# Patient Record
Sex: MALE | Race: WHITE | URBAN - METROPOLITAN AREA
[De-identification: names, ages, dates, MRNs, and addresses within clinical notes are randomized per-mention and may not be internally consistent; named-entity substitution may affect disease eponyms.]

---

## 2017-01-01 ENCOUNTER — CARE COORDINATION (OUTPATIENT)
Dept: GASTROENTEROLOGY | Facility: CLINIC | Age: 57
End: 2017-01-01

## 2017-01-01 ENCOUNTER — OFFICE VISIT (OUTPATIENT)
Dept: GASTROENTEROLOGY | Facility: CLINIC | Age: 57
End: 2017-01-01
Attending: INTERNAL MEDICINE
Payer: COMMERCIAL

## 2017-01-01 VITALS
BODY MASS INDEX: 26.38 KG/M2 | WEIGHT: 188.4 LBS | HEART RATE: 91 BPM | HEIGHT: 71 IN | TEMPERATURE: 98.5 F | SYSTOLIC BLOOD PRESSURE: 117 MMHG | DIASTOLIC BLOOD PRESSURE: 77 MMHG

## 2017-01-01 DIAGNOSIS — K70.31 ALCOHOLIC CIRRHOSIS OF LIVER WITH ASCITES (H): Primary | ICD-10-CM

## 2017-01-01 DIAGNOSIS — K74.60 CIRRHOSIS OF LIVER (H): ICD-10-CM

## 2017-01-01 DIAGNOSIS — K70.31 ALCOHOLIC CIRRHOSIS OF LIVER WITH ASCITES (H): ICD-10-CM

## 2017-01-01 DIAGNOSIS — K76.82 HEPATIC ENCEPHALOPATHY (H): ICD-10-CM

## 2017-01-01 LAB
ALBUMIN SERPL-MCNC: 2.7 G/DL (ref 3.4–5)
ALBUMIN SERPL-MCNC: 3 G/DL (ref 3.4–5)
ALP SERPL-CCNC: 134 U/L (ref 40–150)
ALP SERPL-CCNC: 204 U/L (ref 40–150)
ALT SERPL W P-5'-P-CCNC: 34 U/L (ref 0–70)
ALT SERPL W P-5'-P-CCNC: 75 U/L (ref 0–70)
ANION GAP SERPL CALCULATED.3IONS-SCNC: 7 MMOL/L (ref 3–14)
ANION GAP SERPL CALCULATED.3IONS-SCNC: 8 MMOL/L (ref 3–14)
AST SERPL W P-5'-P-CCNC: 46 U/L (ref 0–45)
AST SERPL W P-5'-P-CCNC: 99 U/L (ref 0–45)
BILIRUB DIRECT SERPL-MCNC: 0.5 MG/DL (ref 0–0.2)
BILIRUB DIRECT SERPL-MCNC: 1.6 MG/DL (ref 0–0.2)
BILIRUB SERPL-MCNC: 1.5 MG/DL (ref 0.2–1.3)
BILIRUB SERPL-MCNC: 4.3 MG/DL (ref 0.2–1.3)
BUN SERPL-MCNC: 10 MG/DL (ref 7–30)
BUN SERPL-MCNC: 6 MG/DL (ref 7–30)
CALCIUM SERPL-MCNC: 8 MG/DL (ref 8.5–10.1)
CALCIUM SERPL-MCNC: 8.4 MG/DL (ref 8.5–10.1)
CHLORIDE SERPL-SCNC: 107 MMOL/L (ref 94–109)
CHLORIDE SERPL-SCNC: 90 MMOL/L (ref 94–109)
CO2 SERPL-SCNC: 27 MMOL/L (ref 20–32)
CO2 SERPL-SCNC: 32 MMOL/L (ref 20–32)
CREAT SERPL-MCNC: 0.68 MG/DL (ref 0.66–1.25)
CREAT SERPL-MCNC: 0.74 MG/DL (ref 0.66–1.25)
ERYTHROCYTE [DISTWIDTH] IN BLOOD BY AUTOMATED COUNT: 14.5 % (ref 10–15)
ERYTHROCYTE [DISTWIDTH] IN BLOOD BY AUTOMATED COUNT: 15.2 % (ref 10–15)
GFR SERPL CREATININE-BSD FRML MDRD: >90 ML/MIN/1.7M2
GFR SERPL CREATININE-BSD FRML MDRD: ABNORMAL ML/MIN/1.7M2
GLUCOSE SERPL-MCNC: 101 MG/DL (ref 70–99)
GLUCOSE SERPL-MCNC: 120 MG/DL (ref 70–99)
HCT VFR BLD AUTO: 45.3 % (ref 40–53)
HCT VFR BLD AUTO: 49.8 % (ref 40–53)
HGB BLD-MCNC: 15.6 G/DL (ref 13.3–17.7)
HGB BLD-MCNC: 17.6 G/DL (ref 13.3–17.7)
INR PPP: 1.12 (ref 0.86–1.14)
INR PPP: 1.4 (ref 0.86–1.14)
MCH RBC QN AUTO: 33.7 PG (ref 26.5–33)
MCH RBC QN AUTO: 33.7 PG (ref 26.5–33)
MCHC RBC AUTO-ENTMCNC: 34.4 G/DL (ref 31.5–36.5)
MCHC RBC AUTO-ENTMCNC: 35.3 G/DL (ref 31.5–36.5)
MCV RBC AUTO: 95 FL (ref 78–100)
MCV RBC AUTO: 98 FL (ref 78–100)
PLATELET # BLD AUTO: 124 10E9/L (ref 150–450)
PLATELET # BLD AUTO: 69 10E9/L (ref 150–450)
POTASSIUM SERPL-SCNC: 3.8 MMOL/L (ref 3.4–5.3)
POTASSIUM SERPL-SCNC: 3.8 MMOL/L (ref 3.4–5.3)
PROT SERPL-MCNC: 5.9 G/DL (ref 6.8–8.8)
PROT SERPL-MCNC: 6.4 G/DL (ref 6.8–8.8)
RBC # BLD AUTO: 4.63 10E12/L (ref 4.4–5.9)
RBC # BLD AUTO: 5.23 10E12/L (ref 4.4–5.9)
SODIUM SERPL-SCNC: 129 MMOL/L (ref 133–144)
SODIUM SERPL-SCNC: 142 MMOL/L (ref 133–144)
WBC # BLD AUTO: 5.9 10E9/L (ref 4–11)
WBC # BLD AUTO: 6.3 10E9/L (ref 4–11)

## 2017-01-01 PROCEDURE — 80048 BASIC METABOLIC PNL TOTAL CA: CPT | Performed by: INTERNAL MEDICINE

## 2017-01-01 PROCEDURE — 85027 COMPLETE CBC AUTOMATED: CPT | Performed by: INTERNAL MEDICINE

## 2017-01-01 PROCEDURE — 85610 PROTHROMBIN TIME: CPT | Performed by: INTERNAL MEDICINE

## 2017-01-01 PROCEDURE — 99213 OFFICE O/P EST LOW 20 MIN: CPT | Mod: ZF

## 2017-01-01 PROCEDURE — 36415 COLL VENOUS BLD VENIPUNCTURE: CPT | Performed by: INTERNAL MEDICINE

## 2017-01-01 PROCEDURE — 80076 HEPATIC FUNCTION PANEL: CPT | Performed by: INTERNAL MEDICINE

## 2017-01-01 RX ORDER — FUROSEMIDE 40 MG
40 TABLET ORAL DAILY
Qty: 30 TABLET | Refills: 11 | Status: SHIPPED | OUTPATIENT
Start: 2017-01-01

## 2017-01-01 RX ORDER — SPIRONOLACTONE 100 MG/1
TABLET, FILM COATED ORAL
Qty: 30 TABLET | Refills: 11 | Status: SHIPPED | OUTPATIENT
Start: 2017-01-01

## 2017-01-01 ASSESSMENT — PAIN SCALES - GENERAL: PAINLEVEL: NO PAIN (0)

## 2017-01-03 ENCOUNTER — PRE VISIT (OUTPATIENT)
Dept: GASTROENTEROLOGY | Facility: CLINIC | Age: 57
End: 2017-01-03

## 2017-01-03 NOTE — TELEPHONE ENCOUNTER
Was the patient contacted by phone and reminded of the upcoming visit? Yes    Was the patient instructed to bring a current list of all medications to the appointment or instructed to bring in all medication bottles? Yes, patient verbalized understanding    Is it anticipated the patient will need additional appointments? Yes, Testing    Were the additional appointments scheduled? Yes, reminded pt to fast for 8 hours prior to ultrasound, pt verbally understood and agreed    Was the patient instructed to arrive prior to the appointment time to have ordered labs drawn? Yes, patient verbalized understanding    Were the needed lab orders placed? Yes    KHOA MARIN CMA

## 2017-01-10 ENCOUNTER — OFFICE VISIT (OUTPATIENT)
Dept: GASTROENTEROLOGY | Facility: CLINIC | Age: 57
End: 2017-01-10
Attending: INTERNAL MEDICINE
Payer: COMMERCIAL

## 2017-01-10 VITALS
DIASTOLIC BLOOD PRESSURE: 83 MMHG | SYSTOLIC BLOOD PRESSURE: 138 MMHG | WEIGHT: 198.1 LBS | TEMPERATURE: 98.3 F | HEART RATE: 94 BPM | RESPIRATION RATE: 18 BRPM | BODY MASS INDEX: 27.73 KG/M2 | OXYGEN SATURATION: 94 % | HEIGHT: 71 IN

## 2017-01-10 DIAGNOSIS — N52.8 OTHER MALE ERECTILE DYSFUNCTION: ICD-10-CM

## 2017-01-10 DIAGNOSIS — K70.31 ALCOHOLIC CIRRHOSIS OF LIVER WITH ASCITES (H): Primary | ICD-10-CM

## 2017-01-10 DIAGNOSIS — K70.31 ALCOHOLIC CIRRHOSIS OF LIVER WITH ASCITES (H): ICD-10-CM

## 2017-01-10 LAB
ALBUMIN SERPL-MCNC: 3.2 G/DL (ref 3.4–5)
ALP SERPL-CCNC: 148 U/L (ref 40–150)
ALT SERPL W P-5'-P-CCNC: 33 U/L (ref 0–70)
ANION GAP SERPL CALCULATED.3IONS-SCNC: 9 MMOL/L (ref 3–14)
AST SERPL W P-5'-P-CCNC: 42 U/L (ref 0–45)
BILIRUB DIRECT SERPL-MCNC: 0.8 MG/DL (ref 0–0.2)
BILIRUB SERPL-MCNC: 2.5 MG/DL (ref 0.2–1.3)
BUN SERPL-MCNC: 12 MG/DL (ref 7–30)
CALCIUM SERPL-MCNC: 8.6 MG/DL (ref 8.5–10.1)
CHLORIDE SERPL-SCNC: 106 MMOL/L (ref 94–109)
CO2 SERPL-SCNC: 25 MMOL/L (ref 20–32)
CREAT SERPL-MCNC: 0.66 MG/DL (ref 0.66–1.25)
ERYTHROCYTE [DISTWIDTH] IN BLOOD BY AUTOMATED COUNT: 15.2 % (ref 10–15)
GFR SERPL CREATININE-BSD FRML MDRD: NORMAL ML/MIN/1.7M2
GLUCOSE SERPL-MCNC: 97 MG/DL (ref 70–99)
HCT VFR BLD AUTO: 51.1 % (ref 40–53)
HGB BLD-MCNC: 18 G/DL (ref 13.3–17.7)
INR PPP: 1.14 (ref 0.86–1.14)
MCH RBC QN AUTO: 33.3 PG (ref 26.5–33)
MCHC RBC AUTO-ENTMCNC: 35.2 G/DL (ref 31.5–36.5)
MCV RBC AUTO: 95 FL (ref 78–100)
PLATELET # BLD AUTO: 130 10E9/L (ref 150–450)
POTASSIUM SERPL-SCNC: 4.2 MMOL/L (ref 3.4–5.3)
PROT SERPL-MCNC: 6.8 G/DL (ref 6.8–8.8)
RBC # BLD AUTO: 5.4 10E12/L (ref 4.4–5.9)
SODIUM SERPL-SCNC: 140 MMOL/L (ref 133–144)
WBC # BLD AUTO: 7.1 10E9/L (ref 4–11)

## 2017-01-10 PROCEDURE — 99213 OFFICE O/P EST LOW 20 MIN: CPT | Mod: ZF

## 2017-01-10 PROCEDURE — 90686 IIV4 VACC NO PRSV 0.5 ML IM: CPT | Mod: ZF

## 2017-01-10 PROCEDURE — 36415 COLL VENOUS BLD VENIPUNCTURE: CPT | Performed by: INTERNAL MEDICINE

## 2017-01-10 PROCEDURE — 85610 PROTHROMBIN TIME: CPT | Performed by: INTERNAL MEDICINE

## 2017-01-10 PROCEDURE — G0008 ADMIN INFLUENZA VIRUS VAC: HCPCS

## 2017-01-10 PROCEDURE — 80048 BASIC METABOLIC PNL TOTAL CA: CPT | Performed by: INTERNAL MEDICINE

## 2017-01-10 PROCEDURE — 85027 COMPLETE CBC AUTOMATED: CPT | Performed by: INTERNAL MEDICINE

## 2017-01-10 PROCEDURE — 80076 HEPATIC FUNCTION PANEL: CPT | Performed by: INTERNAL MEDICINE

## 2017-01-10 PROCEDURE — 25000128 H RX IP 250 OP 636: Mod: ZF

## 2017-01-10 RX ORDER — GABAPENTIN 300 MG/1
300 CAPSULE ORAL DAILY PRN
COMMUNITY

## 2017-01-10 RX ORDER — TRAZODONE HYDROCHLORIDE 100 MG/1
100 TABLET ORAL
COMMUNITY

## 2017-01-10 NOTE — Clinical Note
"1/10/2017       RE: Rubin Siddiqui  [address] [facility] [address]     Dear Colleague,    Thank you for referring your patient, Rubin Siddiqui, to the [facility] HEPATOLOGY at [facility]. Please see a copy of my visit note below.    [facility]    Hepatology follow-up    Follow-up visit for cirrhosis    Subjective:  55 year old male    Cirrhosis  - dx Feb 2015  - ETOH, last ETOH Feb 2015  - hx ascites  - hx HE  - no hx variceal bleeding  - EGD March 2015- no varices, ?mild portal HTN gastropathy  - HCC screening- abdominal U/S May 2015    Patient presents to clinic alone for follow-up of alcoholic cirrhosis.  He denies any new medications, ER visits or hospital admissions.      Patient is well overall.  He reports ongoing fatigue. Reports that he was in [place] for a week prior to [place] and that was the best he has felt in a long time. He does not exercise and \"goes out to eat\" for fun. He states that he is about 40 lbs heavier than what he considers his baseline weight. No lower extremity edema but he does think his abd is more distended. No sleep/wake cycle disturbances or confusion. Patient denies melena, hematemesis or hematochezia.Patient denies fevers, sweats or chills.  Weight stable.    10 pt ROS negative unless stated above.     Past Medical History   Diagnosis Date     Alcohol abuse      Alcoholic cirrhosis of liver with ascites (H)        Current Outpatient Prescriptions on File Prior to Visit:  sildenafil (VIAGRA) 50 MG tablet Take 0.5-1 tablets (25-50 mg) by mouth daily as needed for erectile dysfunction Take 30 min to 4 hours before intercourse.  Never use with nitroglycerin, terazosin or doxazosin.   furosemide (LASIX) 40 MG tablet Take 1 tablet (40 mg) by mouth daily   spironolactone (ALDACTONE) 100 MG tablet Take 1 tablet (100 mg) by mouth daily Take 1 Tab by mouth Once Daily.   rifaximin (XIFAXAN) 550 MG TABS Take 1 " "tablet (550 mg) by mouth 2 times daily   lactulose (CHRONULAC) 10 GM/15ML solution 2 times daily    Multiple Vitamins-Minerals (MULTIVITAMIN PO) Take 1 tablet by mouth daily   vitamin  B complex with vitamin C (VITAMIN  B COMPLEX) TABS Take 1 tablet by mouth daily   [DISCONTINUED] gabapentin (NEURONTIN) 100 MG capsule Take 100 mg by mouth as needed Take 1 Cap by mouth Twice a Day.     No current facility-administered medications on file prior to visit.     Allergies: No known allergies    Physical Exam:  /83 mmHg  Pulse 94  Temp(Src) 98.3  F (36.8  C) (Oral)  Resp 18  Ht 1.803 m (5' 10.98\")  Wt 89.858 kg (198 lb 1.6 oz)  BMI 27.64 kg/m2  SpO2 94%  General: Alert and oriented.  In NAD.  Answers appropriately.    HEENT: Head is AT/NC. PERRL. EOMI. Sclera anicteric. No conjunctival injection.  Oropharynx is clear, moist and w/o exudate or lesions.  Neck: No masses or thyromegaly.  Lungs: Clear to auscultation bilaterally.  No wheezes, rhonchi or crackles.    Heart: Regular rate and rhythm.  No murmurs, gallops or rubs.  Normal S1 and S2.  Abdomen: Soft, non-tender, non-distended.  BS +.  No hepatosplenomegaly.  Extremities: No pedal edema.  Dorsal pedal and posterior tibial pulses are 2/4 bilaterally.  Heme/Lymph: No cervical, supraclavicular adenopathy. No petechiae or purpura.  Neurologic: Grossly non-focal. No asterixis    Labs:  Last Basic Metabolic Panel:  NA      140   1/10/2017   POTASSIUM      4.2   1/10/2017  CHLORIDE      106   1/10/2017  CHRIS      8.6   1/10/2017  CO2       25   1/10/2017  BUN       12   1/10/2017  CR     0.66   1/10/2017  GLC       97   1/10/2017    WBC      7.1   1/10/2017  RBC     5.40   1/10/2017  HGB     18.0   1/10/2017  HCT     51.1   1/10/2017  No components found with this name: mct  MCV       95   1/10/2017  MCH     33.3   1/10/2017  MCHC     35.2   1/10/2017  RDW     15.2   1/10/2017  PLT      130   1/10/2017    AST       42   1/10/2017  ALT       33   1/10/2017  No " results found for this basename: BiliConj   BILITOTAL      2.5   1/10/2017  ALBUMIN      3.2   1/10/2017  PROTTOTAL      6.8   1/10/2017   ALKPHOS      148   1/10/2017    INR     1.14   1/10/2017    MELD-Na score: 11 at 1/10/2017 10:28 AM  MELD score: 11 at 1/10/2017 10:28 AM  Calculated from:  Serum Creatinine: 1.0 at 1/10/2017 10:28 AM  Serum Sodium: 137 at 1/10/2017 10:28 AM  Total Bilirubin: 2.5 at 1/10/2017 10:28 AM  INR(ratio): 1.1 at 1/10/2017 10:28 AM  Age: 56 years    US Abd: PENDING    Assessment:   56yoM with decompensated alcoholic cirrhosis manifested as HE and medically controlled ascites. MELD-Na: 11 today. We further discussed the living donor and  donor liver transplant process. No indication to initiate transplant workup at this time. Due to his low MELD score, living donor would be most likely option though he does not identify any living donors whom would be willing to donate. Otherwise, he is overall doing well. He has some weight gain which I suspect is due to dietary indiscretion and lack of exercise rather than fluid retention but will assess with US.     Plan:   *Hepatoma screening US today. Can assess for ascites at that time as well  *Reiterated low Na diet.   *Encouraged diet and exercise  *Continue lasix 40mg qd and spironolactone 100mg qd  *Continue lactulose and rifaximin  *EGD for variceal screening in 2018    Return to clinic in 6 months.     Discussed and seen with Dr. Ramírez.     Augustin Grider MD  GI Fellow    Physician Attestation  I, Jonny Ramírez, saw this patient with the fellow and agree with the fellow s findings and plan of care as documented in the fellow s note.  I personally reviewed vital signs, medications, labs and imaging.    Jonny Ramírez  Date of Service (when I saw the patient): 01/10/2017            Again, thank you for allowing me to participate in the care of your patient.      Sincerely,    Jonny Ramírez MD

## 2017-01-10 NOTE — PROGRESS NOTES
"[facility]    Hepatology follow-up    Follow-up visit for cirrhosis    Subjective:  55 year old male    Cirrhosis  - dx Feb 2015  - ETOH, last ETOH Feb 2015  - hx ascites  - hx HE  - no hx variceal bleeding  - EGD March 2015- no varices, ?mild portal HTN gastropathy  - HCC screening- abdominal U/S May 2015    Patient presents to clinic alone for follow-up of alcoholic cirrhosis.  He denies any new medications, ER visits or hospital admissions.      Patient is well overall.  He reports ongoing fatigue. Reports that he was in [place] for a week prior to [place] and that was the best he has felt in a long time. He does not exercise and \"goes out to eat\" for fun. He states that he is about 40 lbs heavier than what he considers his baseline weight. No lower extremity edema but he does think his abd is more distended. No sleep/wake cycle disturbances or confusion. Patient denies melena, hematemesis or hematochezia.Patient denies fevers, sweats or chills.  Weight stable.    10 pt ROS negative unless stated above.     Past Medical History   Diagnosis Date     Alcohol abuse      Alcoholic cirrhosis of liver with ascites (H)        Current Outpatient Prescriptions on File Prior to Visit:  sildenafil (VIAGRA) 50 MG tablet Take 0.5-1 tablets (25-50 mg) by mouth daily as needed for erectile dysfunction Take 30 min to 4 hours before intercourse.  Never use with nitroglycerin, terazosin or doxazosin.   furosemide (LASIX) 40 MG tablet Take 1 tablet (40 mg) by mouth daily   spironolactone (ALDACTONE) 100 MG tablet Take 1 tablet (100 mg) by mouth daily Take 1 Tab by mouth Once Daily.   rifaximin (XIFAXAN) 550 MG TABS Take 1 tablet (550 mg) by mouth 2 times daily   lactulose (CHRONULAC) 10 GM/15ML solution 2 times daily    Multiple Vitamins-Minerals (MULTIVITAMIN PO) Take 1 tablet by mouth daily   vitamin  B complex with vitamin C (VITAMIN  B COMPLEX) TABS Take 1 tablet by mouth daily   [DISCONTINUED] " "gabapentin (NEURONTIN) 100 MG capsule Take 100 mg by mouth as needed Take 1 Cap by mouth Twice a Day.     No current facility-administered medications on file prior to visit.     Allergies: No known allergies    Physical Exam:  /83 mmHg  Pulse 94  Temp(Src) 98.3  F (36.8  C) (Oral)  Resp 18  Ht 1.803 m (5' 10.98\")  Wt 89.858 kg (198 lb 1.6 oz)  BMI 27.64 kg/m2  SpO2 94%  General: Alert and oriented.  In NAD.  Answers appropriately.    HEENT: Head is AT/NC. PERRL. EOMI. Sclera anicteric. No conjunctival injection.  Oropharynx is clear, moist and w/o exudate or lesions.  Neck: No masses or thyromegaly.  Lungs: Clear to auscultation bilaterally.  No wheezes, rhonchi or crackles.    Heart: Regular rate and rhythm.  No murmurs, gallops or rubs.  Normal S1 and S2.  Abdomen: Soft, non-tender, non-distended.  BS +.  No hepatosplenomegaly.  Extremities: No pedal edema.  Dorsal pedal and posterior tibial pulses are 2/4 bilaterally.  Heme/Lymph: No cervical, supraclavicular adenopathy. No petechiae or purpura.  Neurologic: Grossly non-focal. No asterixis    Labs:  Last Basic Metabolic Panel:  NA      140   1/10/2017   POTASSIUM      4.2   1/10/2017  CHLORIDE      106   1/10/2017  CHRIS      8.6   1/10/2017  CO2       25   1/10/2017  BUN       12   1/10/2017  CR     0.66   1/10/2017  GLC       97   1/10/2017    WBC      7.1   1/10/2017  RBC     5.40   1/10/2017  HGB     18.0   1/10/2017  HCT     51.1   1/10/2017  No components found with this name: mct  MCV       95   1/10/2017  MCH     33.3   1/10/2017  MCHC     35.2   1/10/2017  RDW     15.2   1/10/2017  PLT      130   1/10/2017    AST       42   1/10/2017  ALT       33   1/10/2017  No results found for this basename: BiliConj   BILITOTAL      2.5   1/10/2017  ALBUMIN      3.2   1/10/2017  PROTTOTAL      6.8   1/10/2017   ALKPHOS      148   1/10/2017    INR     1.14   1/10/2017    MELD-Na score: 11 at 1/10/2017 10:28 AM  MELD score: 11 at 1/10/2017 10:28 " AM  Calculated from:  Serum Creatinine: 1.0 at 1/10/2017 10:28 AM  Serum Sodium: 137 at 1/10/2017 10:28 AM  Total Bilirubin: 2.5 at 1/10/2017 10:28 AM  INR(ratio): 1.1 at 1/10/2017 10:28 AM  Age: 56 years    US Abd: PENDING    Assessment:   56yoM with decompensated alcoholic cirrhosis manifested as HE and medically controlled ascites. MELD-Na: 11 today. We further discussed the living donor and  donor liver transplant process. No indication to initiate transplant workup at this time. Due to his low MELD score, living donor would be most likely option though he does not identify any living donors whom would be willing to donate. Otherwise, he is overall doing well. He has some weight gain which I suspect is due to dietary indiscretion and lack of exercise rather than fluid retention but will assess with US.     Plan:   *Hepatoma screening US today. Can assess for ascites at that time as well  *Reiterated low Na diet.   *Encouraged diet and exercise  *Continue lasix 40mg qd and spironolactone 100mg qd  *Continue lactulose and rifaximin  *EGD for variceal screening in 2018    Return to clinic in 6 months.     Discussed and seen with Dr. Ramírez.     Augustin Grider MD  GI Fellow    Physician Attestation  I, Jonny Ramírez, saw this patient with the fellow and agree with the fellow s findings and plan of care as documented in the fellow s note.  I personally reviewed vital signs, medications, labs and imaging.    Jonny Ramírez  Date of Service (when I saw the patient): 01/10/2017

## 2017-01-10 NOTE — MR AVS SNAPSHOT
After Visit Summary   1/10/2017    Rubin Siddiqui    MRN: [id number]           Patient Information     Date Of Birth          1960        Visit Information        Provider Department      1/10/2017 11:00 AM Jonny Baldwin MD [facility]         Follow-ups after your visit        Follow-up notes from your care team     Return in about 6 months (around 7/10/2017).      Your next 10 appointments already scheduled     Jul 11, 2017 10:00 AM   Lab with  LAB   [facility] ([facility])    [address] [place] [address]  [address] [facility] [id number] [phone]            Jul 11, 2017 11:00 AM   (Arrive by 10:45 AM)   Return General Liver with Jonny Rehman MD   [facility] ([facility])    [address] [place] [id number] [phone]              Who to contact     If you have questions or need follow up information about today's clinic visit or your schedule please contact [facility] HEPATOLOG[facility] directly at [phone].  Normal or non-critical lab and imaging results will be communicated to you by OpenTrusthart, letter or phone within 4 business days after the clinic has received the results. If you do not hear from us within 7 days, please contact the clinic through OpenTrusthart or phone. If you have a critical or abnormal lab result, we will notify you by phone as soon as possible.  Submit refill requests through Netcontinuum or call your pharmacy and they will forward the refill request to us. Please allow 3 business days for your refill to be completed.          Additional Information About Your Visit        OpenTrusthart Information     Net[employer]uum gives you secure access to your electronic health record. If you see a primary care provider, you can also send messages to your care team and make appointments. If you have questions, please call your primary care clinic.  If you do not have a primary care  "provider, please call [phone] and they will assist you.        Care EveryWhere ID     This is your Care EveryWhere ID. This could be used by other organizations to access your [place] medical records  [id number]        Your Vitals Were     Pulse Temperature Respirations Height BMI (Body Mass Index) Pulse Oximetry    94 98.3  F (36.8  C) (Oral) 18 1.803 m (5' 10.98\") 27.64 kg/m2 94%       Blood Pressure from Last 3 Encounters:   01/10/17 138/83   07/08/16 122/76   01/15/16 127/81    Weight from Last 3 Encounters:   01/10/17 89.858 kg (198 lb 1.6 oz)   07/08/16 88.089 kg (194 lb 3.2 oz)   01/15/16 83.915 kg (185 lb)              Today, you had the following     No orders found for display         Today's Medication Changes          These changes are accurate as of: 1/10/17 12:28 PM.  If you have any questions, ask your nurse or doctor.               These medicines have changed or have updated prescriptions.        Dose/Directions    gabapentin 300 MG capsule   Commonly known as:  NEURONTIN   This may have changed:  Another medication with the same name was removed. Continue taking this medication, and follow the directions you see here.   Changed by:  Jonny Baldwin MD        Dose:  300 mg   Take 300 mg by mouth daily as needed   Refills:  0                Primary Care Provider Office Phone # Fax #    Fernando Pak [phone]       [facility] [address] [place] [id number]        Thank you!     Thank you for choosing [facility]PATOLOG[facility]  for your care. Our goal is always to provide you with excellent care. Hearing back from our patients is one way we can continue to improve our services. Please take a few minutes to complete the written survey that you may receive in the mail after your visit with us. Thank you!             Your Updated Medication List - Protect others around you: Learn how to safely use, store and throw away your medicines at " www.disposemymeds.org.          This list is accurate as of: 1/10/17 12:28 PM.  Always use your most recent med list.                   Brand Name Dispense Instructions for use    furosemide 40 MG tablet    LASIX    30 tablet    Take 1 tablet (40 mg) by mouth daily       gabapentin 300 MG capsule    NEURONTIN     Take 300 mg by mouth daily as needed       lactulose 10 GM/15ML solution    CHRONULAC     2 times daily       MULTIVITAMIN PO      Take 1 tablet by mouth daily       rifaximin 550 MG Tabs tablet    XIFAXAN    60 tablet    Take 1 tablet (550 mg) by mouth 2 times daily       sildenafil 50 MG cap/tab    REVATIO/VIAGRA    6 tablet    Take 0.5-1 tablets (25-50 mg) by mouth daily as needed for erectile dysfunction Take 30 min to 4 hours before intercourse.  Never use with nitroglycerin, terazosin or doxazosin.       spironolactone 100 MG tablet    ALDACTONE    30 tablet    Take 1 tablet (100 mg) by mouth daily Take 1 Tab by mouth Once Daily.       traZODone 100 MG tablet    DESYREL     Take 100 mg by mouth nightly as needed for sleep       vitamin B complex with vitamin C Tabs tablet      Take 1 tablet by mouth daily

## 2017-01-10 NOTE — NURSING NOTE
"Chief Complaint   Patient presents with     RECHECK     alcohol induced cirrhosis       Initial /83 mmHg  Pulse 94  Temp(Src) 98.3  F (36.8  C) (Oral)  Resp 18  Ht 1.803 m (5' 10.98\")  Wt 89.858 kg (198 lb 1.6 oz)  BMI 27.64 kg/m2  SpO2 94% Estimated body mass index is 27.64 kg/(m^2) as calculated from the following:    Height as of this encounter: 1.803 m (5' 10.98\").    Weight as of this encounter: 89.858 kg (198 lb 1.6 oz).  BP completed using cuff size: regular    "

## 2017-02-13 ENCOUNTER — CARE COORDINATION (OUTPATIENT)
Dept: GASTROENTEROLOGY | Facility: CLINIC | Age: 57
End: 2017-02-13

## 2017-02-13 NOTE — PROGRESS NOTES
Follow up care coordination call made to patient. Patient reports he is doing well. No new changes to report. Does feel he is gaining weight, primarily in abdominal region. Initially thought it was ascites, however this was ruled out in January ultrasound. Encouraged patient to walk daily or light exercise/ healthy eating habits. Patient states he looks forward to warmer weather to get outside. Encouraged patient to contact clinic with any changes or concerns. Patient agrees with plan of care. Had no further questions or needs, appreciated phone call.

## 2017-03-30 DIAGNOSIS — K70.31 ALCOHOLIC CIRRHOSIS OF LIVER WITH ASCITES (H): ICD-10-CM

## 2017-03-30 DIAGNOSIS — K76.82 HEPATIC ENCEPHALOPATHY (H): ICD-10-CM

## 2017-03-31 ENCOUNTER — TELEPHONE (OUTPATIENT)
Dept: GASTROENTEROLOGY | Facility: CLINIC | Age: 57
End: 2017-03-31

## 2017-03-31 RX ORDER — RIFAXIMIN 550 MG/1
TABLET ORAL
Qty: 60 TABLET | Refills: 11 | Status: SHIPPED | OUTPATIENT
Start: 2017-03-31

## 2017-03-31 NOTE — TELEPHONE ENCOUNTER
Prior Authorization Specialty Medication Request    Medication/Dose: Xifaxan 550mg  Diagnosis and ICD: Hepatic encephalopathy K72.90  New/Renewal/Insurance Change PA: Renewal    Important Lab Values:     Previously Tried and Failed Therapies: lactulose    Rationale: Xifaxan helps to lower the toxin build up in the blood while lactulose works by cleansing the toxin build up in the liver. Adjunctive therapy.    Would you like to include any research articles?    If yes please include the hyperlink(s) below or fax @ [phone].    (Include Name and MRN)    If you received a fax notification from an outside Pharmacy;  Pharmacy Name:  Pharmacy #:  Pharmacy Fax:

## 2017-04-10 NOTE — TELEPHONE ENCOUNTER
Rubin is calling to inquire about the status of his prior authorization for Xifaxan.  He is concerned about what will happen if he does note take it for a few weeks.    He is requesting a call back at [phone] if there is any update.

## 2017-04-11 NOTE — TELEPHONE ENCOUNTER
"Received call back from patient stating he spoke with his pharmacy and will be able to restart Xifaxan today. Patient states he has been without medication for approximately 2 weeks, but has been taking lactulose in meantime. Patient states he feels \"sluggish\", but does not believe he has had any signs of overt confusion. Answering all questions appropriately. Recommended he continue with at least 1 dose of lactulose along with Xifaxan BID. Encouraged him to contact clinic with any changes or concerns. Patient agreed with plan of care.   "

## 2017-04-11 NOTE — TELEPHONE ENCOUNTER
Voicemail left for patient with direct contact info and call back request. Due to urgency, PA initiated by . Spoke with Tej for Medicaid PA dept [phone]. PA completed and expedited.     Per Tej, PA has been approved for 12 months.  contacted [facility] to determine co-pay. Patient will be paying $20 for 30 day supply.

## 2017-05-01 NOTE — PROGRESS NOTES
Patient states he has been taking Xifaxan regularly now, along with lactulose to maintain about 3 bowel movements daily.  He does not feel he has confusion at all, but does have days that he feels very tired and sleeps long hours, and days that he has trouble sleeping.  He is bothered by his umbilical hernia, which is not new nor painful, but is more noticeable.  He would like to ask Dr. Ramírez about possible surgical repair.  He states his abdomen is large and protruding, but does not have fluid accumulation, per US.  He also denies fluid retention in lower extremities.  Reviewed next appointment with Dr. Ramírez in July, and requests US be performed the same day.  Labs and US ordered per protocol, and will send to scheduling.  Updated Care Coordinator information and contact number.  Patient verbalized understanding of the conversation.

## 2017-07-03 NOTE — NURSING NOTE
"Chief Complaint   Patient presents with     RECHECK     follow up with cirrhosis, tzimmer cma       Initial /77  Pulse 91  Temp 98.5  F (36.9  C) (Oral)  Ht 1.803 m (5' 11\")  Wt 85.5 kg (188 lb 6.4 oz)  BMI 26.28 kg/m2 Estimated body mass index is 26.28 kg/(m^2) as calculated from the following:    Height as of this encounter: 1.803 m (5' 11\").    Weight as of this encounter: 85.5 kg (188 lb 6.4 oz).  Medication Reconciliation: complete    "

## 2017-07-03 NOTE — PROGRESS NOTES
[facility]    Hepatology follow-up    Follow-up visit for cirrhosis.    Subjective:  56 year old male    Cirrhosis  - dx Feb 2015  - ETOH, last ETOH Feb 2015  - hx ascites  - hx HE  - no hx variceal bleeding  - EGD March 2015- no varices, ?mild portal HTN gastropathy  - HCC screening- Abd U/S July 2017    Comes to clinic this afternoon by himself.  Last clinic visit in an 2017.  No new medications, ER visits or hospital admissions since last visit.    Doing fine.  Was in [place] last week again with his daughter on a 2 week vacation.  He strongly denies any alcohol use while on vacation.  No signs or symptoms specific to liver disease.    Patient denies jaundice, lower extremity edema, abdominal distension, lethargy or confusion.    Patient denies melena, hematemesis or hematochezia.    Patient denies fevers, sweats or chills.  Weight decreased by 6 lbs since last visit.    Patient denies ETOH since Feb 2015.      Medical hx Surgical hx   Past Medical History:   Diagnosis Date     Alcohol abuse      Alcoholic cirrhosis of liver with ascites (H)       Past Surgical History:   Procedure Laterality Date     HERNIORRHAPHY UMBILICAL  2010          Medications  Prior to Admission medications    Medication Sig Start Date End Date Taking? Authorizing Provider   spironolactone (ALDACTONE) 100 MG tablet TAKE ONE TABLET BY MOUTH ONCE DAILY 5/3/17  Yes Jonny Baldwin MD   furosemide (LASIX) 40 MG tablet Take 1 tablet (40 mg) by mouth daily 5/3/17  Yes Jonny Baldwin MD   XIFAXAN 550 MG TABS tablet TAKE ONE TABLET BY MOUTH TWICE A DAY 3/31/17  Yes Jonny Baldwin MD   gabapentin (NEURONTIN) 300 MG capsule Take 300 mg by mouth daily as needed   Yes Reported, Patient   lactulose (CHRONULAC) 10 GM/15ML solution 2 times daily  4/16/15  Yes Reported, Patient   Multiple Vitamins-Minerals (MULTIVITAMIN PO) Take 1 tablet by mouth daily   Yes Reported, Patient   vitamin  B complex with  "vitamin C (VITAMIN  B COMPLEX) TABS Take 1 tablet by mouth daily   Yes Reported, Patient   traZODone (DESYREL) 100 MG tablet Take 100 mg by mouth nightly as needed for sleep    Reported, Patient       Allergies  No Known Allergies    Review of systems  A 10-point review of systems was negative    Examination  /77  Pulse 91  Temp 98.5  F (36.9  C) (Oral)  Ht 1.803 m (5' 11\")  Wt 85.5 kg (188 lb 6.4 oz)  BMI 26.28 kg/m2  Body mass index is 26.28 kg/(m^2).    Gen- well, NAD, A+Ox3, normal color, moustache  CVS- RRR  RS- CTA  Abd- umbilical hernia repair scar, central obesity, soft, non-tender, small reducible umbilical hernia, no ascites or organomegaly on palpation or percussion, BS+  Extr- hands normal, no DORINA  Skin- no rash or jaundice  Neuro- no asterixis  Psych- normal mood    Laboratory  Lab Results   Component Value Date     07/03/2017    POTASSIUM 3.8 07/03/2017    CHLORIDE 90 07/03/2017    CO2 32 07/03/2017    BUN 10 07/03/2017    CR 0.74 07/03/2017       Lab Results   Component Value Date    BILITOTAL 4.3 07/03/2017    ALT 75 07/03/2017    AST 99 07/03/2017    ALKPHOS 204 07/03/2017       Lab Results   Component Value Date    ALBUMIN 3.0 07/03/2017    PROTTOTAL 6.4 07/03/2017        Lab Results   Component Value Date    WBC 6.3 07/03/2017    HGB 17.6 07/03/2017    MCV 95 07/03/2017    PLT 69 07/03/2017       Lab Results   Component Value Date    INR 1.40 07/03/2017       Radiology  Abd U/S July 2017 reviewed    Assessment  56 year old male who presents to clinic for follow-up of decompensated alcoholic cirrhosis complicated with hepatic encephalopathy.  MELD-Na= 22 (increased).  Last ETOH= Feb 2015.  Liver function tests abnormal since last clinic visit- patient denies any recent alcohol use.  No new symptoms including infective symptoms since last clinic visit.  Will repeat blood work in 4 weeks to evaluate for change.  Up to date with screening for esophageal varices.  Up to date with HCC " screening.    Plan  1.  LFTs, BMP, INR, CBC in 4 weeks  2.  Abstinence from ETOH  3.  Low Na diet  4.  Continue diuretics at current doses  5.  Continue lactulose  6.  Follow-up in 6 months    Jonny Ramírez MD  Hepatology  [facility]

## 2017-07-03 NOTE — MR AVS SNAPSHOT
After Visit Summary   7/3/2017    Rubin Siddiqui    MRN: [id number]           Patient Information     Date Of Birth          1960        Visit Information        Provider Department      7/3/2017 2:30 PM Jonny Baldwin MD [facility]        Today's Diagnoses     Alcoholic cirrhosis of liver with ascites (H)    -  1       Follow-ups after your visit        Follow-up notes from your care team     Return in about 6 months (around 1/3/2018).      Your next 10 appointments already scheduled     Jan 08, 2018 10:00 AM CST   Lab with  LAB   [facility] ([facility])    [address] [place] [address] [id number] [phone]            Jan 08, 2018 11:00 AM CST   (Arrive by 10:45 AM)   Return General Liver with Jonny Rehman MD   [facility] ([facility])    [address] [place] [id number] [phone]              Future tests that were ordered for you today     Open Future Orders        Priority Expected Expires Ordered    INR Routine 8/3/2017 7/3/2018 7/3/2017    Hepatic panel Routine 8/3/2017 7/3/2018 7/3/2017    Basic metabolic panel Routine 8/3/2017 7/3/2018 7/3/2017    CBC with platelets Routine 8/3/2017 7/3/2018 7/3/2017            Who to contact     If you have questions or need follow up information about today's clinic visit or your schedule please contact [facility] HEPATOLOGY directly at [phone].  Normal or non-critical lab and imaging results will be communicated to you by MyChart, letter or phone within 4 business days after the clinic has received the results. If you do not hear from us within 7 days, please contact the clinic through [employer] or phone. If you have a critical or abnormal lab result, we will notify you by phone as soon as possible.  Submit refill requests through [employer] or call your pharmacy and they will forward the refill request to  "us. Please allow 3 business days for your refill to be completed.          Additional Information About Your Visit        [employer] Information     [employer] gives you secure access to your electronic health record. If you see a primary care provider, you can also send messages to your care team and make appointments. If you have questions, please call your primary care clinic.  If you do not have a primary care provider, please call [phone] and they will assist you.        Care EveryWhere ID     This is your Care EveryWhere ID. This could be used by other organizations to access your [place] medical records  [id number]        Your Vitals Were     Pulse Temperature Height BMI (Body Mass Index)          91 98.5  F (36.9  C) (Oral) 1.803 m (5' 11\") 26.28 kg/m2         Blood Pressure from Last 3 Encounters:   07/03/17 117/77   01/10/17 138/83   07/08/16 122/76    Weight from Last 3 Encounters:   07/03/17 85.5 kg (188 lb 6.4 oz)   01/10/17 89.9 kg (198 lb 1.6 oz)   07/08/16 88.1 kg (194 lb 3.2 oz)                 Today's Medication Changes          These changes are accurate as of: 7/3/17  2:35 PM.  If you have any questions, ask your nurse or doctor.               Stop taking these medicines if you haven't already. Please contact your care team if you have questions.     sildenafil 50 MG cap/tab   Commonly known as:  REVATIO/VIAGRA   Stopped by:  Jonny Baldwin MD                    Primary Care Provider Office Phone # Fax #    Fernando Pak [phone]       [facility] [address] [facility] [address]        Equal Access to Services     RENEA GUY AH: Antonia Curtis, edgardo huizar, amie king. So [facility] [phone].    ATENCIÓN: Si habla español, tiene a hancock disposición servicios gratuitos de asistencia lingüística. Llame al [phone].    We comply with applicable federal civil rights laws and " [place] laws. We do not discriminate on the basis of race, color, national origin, age, disability sex, sexual orientation or gender identity.            Thank you!     Thank you for choosing [facility] HEPATOLOGY  for your care. Our goal is always to provide you with excellent care. Hearing back from our patients is one way we can continue to improve our services. Please take a few minutes to complete the written survey that you may receive in the mail after your visit with us. Thank you!             Your Updated Medication List - Protect others around you: Learn how to safely use, store and throw away your medicines at www.disposemymeds.org.          This list is accurate as of: 7/3/17  2:35 PM.  Always use your most recent med list.                   Brand Name Dispense Instructions for use Diagnosis    furosemide 40 MG tablet    LASIX    30 tablet    Take 1 tablet (40 mg) by mouth daily    Cirrhosis of liver (H)       gabapentin 300 MG capsule    NEURONTIN     Take 300 mg by mouth daily as needed        lactulose 10 GM/15ML solution    CHRONULAC     2 times daily        MULTIVITAMIN PO      Take 1 tablet by mouth daily        spironolactone 100 MG tablet    ALDACTONE    30 tablet    TAKE ONE TABLET BY MOUTH ONCE DAILY    Cirrhosis of liver (H)       traZODone 100 MG tablet    DESYREL     Take 100 mg by mouth nightly as needed for sleep        vitamin B complex with vitamin C Tabs tablet      Take 1 tablet by mouth daily        XIFAXAN 550 MG Tabs tablet   Generic drug:  rifaximin     60 tablet    TAKE ONE TABLET BY MOUTH TWICE A DAY    Alcoholic cirrhosis of liver with ascites (H), Hepatic encephalopathy (H)

## 2017-07-03 NOTE — LETTER
7/3/2017       RE: Rubin Siddiqui  [id number] [id number]     Dear Colleague,    Thank you for referring your patient, Rubin Siddiqui, to the [facility] HEPATOLOGY at [facility]. Please see a copy of my visit note below.    [facility]    Hepatology follow-up    Follow-up visit for cirrhosis.    Subjective:  56 year old male    Cirrhosis  - dx Feb 2015  - ETOH, last ETOH Feb 2015  - hx ascites  - hx HE  - no hx variceal bleeding  - EGD March 2015- no varices, ?mild portal HTN gastropathy  - HCC screening- Abd U/S July 2017    Comes to clinic this afternoon by himself.  Last clinic visit in an 2017.  No new medications, ER visits or hospital admissions since last visit.    Doing fine.  Was in [place] last week again with his daughter on a 2 week vacation.  He strongly denies any alcohol use while on vacation.  No signs or symptoms specific to liver disease.    Patient denies jaundice, lower extremity edema, abdominal distension, lethargy or confusion.    Patient denies melena, hematemesis or hematochezia.    Patient denies fevers, sweats or chills.  Weight decreased by 6 lbs since last visit.    Patient denies ETOH since Feb 2015.      Medical hx Surgical hx   Past Medical History:   Diagnosis Date     Alcohol abuse      Alcoholic cirrhosis of liver with ascites (H)       Past Surgical History:   Procedure Laterality Date     HERNIORRHAPHY UMBILICAL  2010          Medications  Prior to Admission medications    Medication Sig Start Date End Date Taking? Authorizing Provider   spironolactone (ALDACTONE) 100 MG tablet TAKE ONE TABLET BY MOUTH ONCE DAILY 5/3/17  Yes Jonny Baldwin MD   furosemide (LASIX) 40 MG tablet Take 1 tablet (40 mg) by mouth daily 5/3/17  Yes Jonny Baldwin MD   XIFAXAN 550 MG TABS tablet TAKE ONE TABLET BY MOUTH TWICE A DAY 3/31/17  Yes Jonny Baldwin MD   gabapentin (NEURONTIN) 300 MG  "capsule Take 300 mg by mouth daily as needed   Yes Reported, Patient   lactulose (CHRONULAC) 10 GM/15ML solution 2 times daily  4/16/15  Yes Reported, Patient   Multiple Vitamins-Minerals (MULTIVITAMIN PO) Take 1 tablet by mouth daily   Yes Reported, Patient   vitamin  B complex with vitamin C (VITAMIN  B COMPLEX) TABS Take 1 tablet by mouth daily   Yes Reported, Patient   traZODone (DESYREL) 100 MG tablet Take 100 mg by mouth nightly as needed for sleep    Reported, Patient       Allergies  No Known Allergies    Review of systems  A 10-point review of systems was negative    Examination  /77  Pulse 91  Temp 98.5  F (36.9  C) (Oral)  Ht 1.803 m (5' 11\")  Wt 85.5 kg (188 lb 6.4 oz)  BMI 26.28 kg/m2  Body mass index is 26.28 kg/(m^2).    Gen- well, NAD, A+Ox3, normal color, moustache  CVS- RRR  RS- CTA  Abd- umbilical hernia repair scar, central obesity, soft, non-tender, small reducible umbilical hernia, no ascites or organomegaly on palpation or percussion, BS+  Extr- hands normal, no DORINA  Skin- no rash or jaundice  Neuro- no asterixis  Psych- normal mood    Laboratory  Lab Results   Component Value Date     07/03/2017    POTASSIUM 3.8 07/03/2017    CHLORIDE 90 07/03/2017    CO2 32 07/03/2017    BUN 10 07/03/2017    CR 0.74 07/03/2017       Lab Results   Component Value Date    BILITOTAL 4.3 07/03/2017    ALT 75 07/03/2017    AST 99 07/03/2017    ALKPHOS 204 07/03/2017       Lab Results   Component Value Date    ALBUMIN 3.0 07/03/2017    PROTTOTAL 6.4 07/03/2017        Lab Results   Component Value Date    WBC 6.3 07/03/2017    HGB 17.6 07/03/2017    MCV 95 07/03/2017    PLT 69 07/03/2017       Lab Results   Component Value Date    INR 1.40 07/03/2017       Radiology  Abd U/S July 2017 reviewed    Assessment  56 year old male who presents to clinic for follow-up of decompensated alcoholic cirrhosis complicated with hepatic encephalopathy.  MELD-Na= 22 (increased).  Last ETOH= Feb 2015.  Liver " function tests abnormal since last clinic visit- patient denies any recent alcohol use.  No new symptoms including infective symptoms since last clinic visit.  Will repeat blood work in 4 weeks to evaluate for change.  Up to date with screening for esophageal varices.  Up to date with HCC screening.    Plan  1.  LFTs, BMP, INR, CBC in 4 weeks  2.  Abstinence from ETOH  3.  Low Na diet  4.  Continue diuretics at current doses  5.  Continue lactulose  6.  Follow-up in 6 months    Jonny Ramírez MD  Hepatology  [facility]

## 2018-01-01 ENCOUNTER — HOSPITAL ENCOUNTER (OUTPATIENT)
Facility: CLINIC | Age: 58
Discharge: HOME | End: 2018-01-26
Attending: INTERNAL MEDICINE | Admitting: INTERNAL MEDICINE
Payer: MEDICARE

## 2018-01-01 ENCOUNTER — TELEPHONE (OUTPATIENT)
Dept: GASTROENTEROLOGY | Facility: CLINIC | Age: 58
End: 2018-01-01

## 2018-01-01 ENCOUNTER — SURGERY (OUTPATIENT)
Age: 58
End: 2018-01-01

## 2018-01-01 ENCOUNTER — OFFICE VISIT (OUTPATIENT)
Dept: GASTROENTEROLOGY | Facility: CLINIC | Age: 58
End: 2018-01-01
Attending: INTERNAL MEDICINE
Payer: MEDICARE

## 2018-01-01 ENCOUNTER — HOSPITAL ENCOUNTER (OUTPATIENT)
Facility: CLINIC | Age: 58
End: 2018-01-01
Attending: INTERNAL MEDICINE | Admitting: INTERNAL MEDICINE
Payer: MEDICARE

## 2018-01-01 VITALS
DIASTOLIC BLOOD PRESSURE: 87 MMHG | HEIGHT: 71 IN | RESPIRATION RATE: 18 BRPM | SYSTOLIC BLOOD PRESSURE: 126 MMHG | TEMPERATURE: 98.1 F | OXYGEN SATURATION: 94 % | WEIGHT: 208.6 LBS | BODY MASS INDEX: 29.2 KG/M2 | HEART RATE: 100 BPM

## 2018-01-01 VITALS
OXYGEN SATURATION: 93 % | SYSTOLIC BLOOD PRESSURE: 125 MMHG | DIASTOLIC BLOOD PRESSURE: 77 MMHG | RESPIRATION RATE: 19 BRPM

## 2018-01-01 DIAGNOSIS — K70.31 ALCOHOLIC CIRRHOSIS OF LIVER WITH ASCITES (H): ICD-10-CM

## 2018-01-01 DIAGNOSIS — K70.31 ALCOHOLIC CIRRHOSIS OF LIVER WITH ASCITES (H): Primary | ICD-10-CM

## 2018-01-01 DIAGNOSIS — Z12.11 SPECIAL SCREENING FOR MALIGNANT NEOPLASMS, COLON: Primary | ICD-10-CM

## 2018-01-01 LAB
ALBUMIN SERPL-MCNC: 2.6 G/DL (ref 3.4–5)
ALP SERPL-CCNC: 161 U/L (ref 40–150)
ALT SERPL W P-5'-P-CCNC: 34 U/L (ref 0–70)
ANION GAP SERPL CALCULATED.3IONS-SCNC: 8 MMOL/L (ref 3–14)
AST SERPL W P-5'-P-CCNC: 51 U/L (ref 0–45)
BILIRUB DIRECT SERPL-MCNC: 0.6 MG/DL (ref 0–0.2)
BILIRUB SERPL-MCNC: 1.8 MG/DL (ref 0.2–1.3)
BUN SERPL-MCNC: 9 MG/DL (ref 7–30)
CALCIUM SERPL-MCNC: 7.8 MG/DL (ref 8.5–10.1)
CHLORIDE SERPL-SCNC: 102 MMOL/L (ref 94–109)
CO2 SERPL-SCNC: 25 MMOL/L (ref 20–32)
COLONOSCOPY: NORMAL
CREAT SERPL-MCNC: 0.6 MG/DL (ref 0.66–1.25)
ERYTHROCYTE [DISTWIDTH] IN BLOOD BY AUTOMATED COUNT: 14.3 % (ref 10–15)
GFR SERPL CREATININE-BSD FRML MDRD: >90 ML/MIN/1.7M2
GLUCOSE SERPL-MCNC: 238 MG/DL (ref 70–99)
HCT VFR BLD AUTO: 48 % (ref 40–53)
HGB BLD-MCNC: 16.4 G/DL (ref 13.3–17.7)
INR PPP: 1.31 (ref 0.86–1.14)
MCH RBC QN AUTO: 32.9 PG (ref 26.5–33)
MCHC RBC AUTO-ENTMCNC: 34.2 G/DL (ref 31.5–36.5)
MCV RBC AUTO: 96 FL (ref 78–100)
PLATELET # BLD AUTO: 86 10E9/L (ref 150–450)
POTASSIUM SERPL-SCNC: 3.7 MMOL/L (ref 3.4–5.3)
PROT SERPL-MCNC: 6.2 G/DL (ref 6.8–8.8)
RBC # BLD AUTO: 4.98 10E12/L (ref 4.4–5.9)
SODIUM SERPL-SCNC: 136 MMOL/L (ref 133–144)
UPPER GI ENDOSCOPY: NORMAL
WBC # BLD AUTO: 5.1 10E9/L (ref 4–11)

## 2018-01-01 PROCEDURE — 80076 HEPATIC FUNCTION PANEL: CPT | Performed by: INTERNAL MEDICINE

## 2018-01-01 PROCEDURE — 99153 MOD SED SAME PHYS/QHP EA: CPT | Performed by: INTERNAL MEDICINE

## 2018-01-01 PROCEDURE — G0121 COLON CA SCRN NOT HI RSK IND: HCPCS | Performed by: INTERNAL MEDICINE

## 2018-01-01 PROCEDURE — G0008 ADMIN INFLUENZA VIRUS VAC: HCPCS | Mod: ZF

## 2018-01-01 PROCEDURE — G0463 HOSPITAL OUTPT CLINIC VISIT: HCPCS | Mod: 25,ZF

## 2018-01-01 PROCEDURE — 85610 PROTHROMBIN TIME: CPT | Performed by: INTERNAL MEDICINE

## 2018-01-01 PROCEDURE — 36415 COLL VENOUS BLD VENIPUNCTURE: CPT | Performed by: INTERNAL MEDICINE

## 2018-01-01 PROCEDURE — 25000125 ZZHC RX 250: Performed by: INTERNAL MEDICINE

## 2018-01-01 PROCEDURE — G0500 MOD SEDAT ENDO SERVICE >5YRS: HCPCS | Performed by: INTERNAL MEDICINE

## 2018-01-01 PROCEDURE — 43235 EGD DIAGNOSTIC BRUSH WASH: CPT | Performed by: INTERNAL MEDICINE

## 2018-01-01 PROCEDURE — 80048 BASIC METABOLIC PNL TOTAL CA: CPT | Performed by: INTERNAL MEDICINE

## 2018-01-01 PROCEDURE — 90686 IIV4 VACC NO PRSV 0.5 ML IM: CPT | Mod: ZF | Performed by: INTERNAL MEDICINE

## 2018-01-01 PROCEDURE — 85027 COMPLETE CBC AUTOMATED: CPT | Performed by: INTERNAL MEDICINE

## 2018-01-01 PROCEDURE — 25000128 H RX IP 250 OP 636: Performed by: INTERNAL MEDICINE

## 2018-01-01 PROCEDURE — 45378 DIAGNOSTIC COLONOSCOPY: CPT | Performed by: INTERNAL MEDICINE

## 2018-01-01 PROCEDURE — 25000128 H RX IP 250 OP 636: Mod: ZF | Performed by: INTERNAL MEDICINE

## 2018-01-01 RX ORDER — FENTANYL CITRATE 50 UG/ML
INJECTION, SOLUTION INTRAMUSCULAR; INTRAVENOUS PRN
Status: DISCONTINUED | OUTPATIENT
Start: 2018-01-01 | End: 2018-01-01 | Stop reason: HOSPADM

## 2018-01-01 RX ORDER — ACETAMINOPHEN 500 MG
1000 TABLET ORAL EVERY 6 HOURS PRN
COMMUNITY

## 2018-01-01 RX ADMIN — FENTANYL CITRATE 100 MCG: 50 INJECTION, SOLUTION INTRAMUSCULAR; INTRAVENOUS at 11:12

## 2018-01-01 RX ADMIN — MIDAZOLAM 2 MG: 1 INJECTION INTRAMUSCULAR; INTRAVENOUS at 11:12

## 2018-01-01 RX ADMIN — INFLUENZA A VIRUS A/MICHIGAN/45/2015 X-275 (H1N1) ANTIGEN (FORMALDEHYDE INACTIVATED), INFLUENZA A VIRUS A/HONG KONG/4801/2014 X-263B (H3N2) ANTIGEN (FORMALDEHYDE INACTIVATED), INFLUENZA B VIRUS B/PHUKET/3073/2013 ANTIGEN (FORMALDEHYDE INACTIVATED), AND INFLUENZA B VIRUS B/BRISBANE/60/2008 ANTIGEN (FORMALDEHYDE INACTIVATED) 0.5 ML: 15; 15; 15; 15 INJECTION, SUSPENSION INTRAMUSCULAR at 12:10

## 2018-01-01 RX ADMIN — BENZOCAINE 1 SPRAY: 220 SPRAY, METERED PERIODONTAL at 11:13

## 2018-01-01 ASSESSMENT — PAIN SCALES - GENERAL: PAINLEVEL: NO PAIN (0)

## 2018-01-08 NOTE — NURSING NOTE
"Chief Complaint   Patient presents with     RECHECK     alcohol induced cirrhosis       Initial /87 (BP Location: Right arm, Patient Position: Sitting, Cuff Size: Adult Regular)  Pulse 100  Temp 98.1  F (36.7  C) (Oral)  Resp 18  Ht 1.803 m (5' 10.98\")  Wt 94.6 kg (208 lb 9.6 oz)  SpO2 94%  BMI 29.11 kg/m2 Estimated body mass index is 29.11 kg/(m^2) as calculated from the following:    Height as of this encounter: 1.803 m (5' 10.98\").    Weight as of this encounter: 94.6 kg (208 lb 9.6 oz).  Medication Reconciliation: complete     Pallavi Hogan [facility]  1/8/2018 11:38 AM        "

## 2018-01-08 NOTE — PROGRESS NOTES
[facility]    Hepatology follow-up    Follow-up visit for cirrhosis    Subjective:  57 year old male    Cirrhosis  - dx Feb 2015  - ETOH, last ETOH Feb 2015  - hx ascites  - hx HE  - no hx variceal bleeding  - EGD March 2015- no varices, ?mild portal HTN gastropathy  - HCC screening- Abd U/S July 2017    The patient presents to clinic this morning for follow-up of alcoholic cirrhosis.  Last clinic visit was 07/03/2017.  Since then, the patient had a fracture of his left wrist which resulted in surgery in 09/2017 at [facility].  The patient denies any new medications since his last clinic visit.      The patient is doing okay overall.  Of note, he has gained 20 pounds since last clinic visit which is most likely related to regularly eating out with his parents.  He does not get any regular exercise.  He feels that he could exercise more regularly if his midline abdominal hernia was repaired.  He denies any symptoms of pain or constipation with regard to his hernia.      The patient denies jaundice, lower extremity edema, abdominal distention, lethargy or confusion.      He denies melena, hematemesis or hematochezia.      No history of fevers, sweats or chills.  The patient already had an influenza vaccination this season.      The patient last drank alcohol in 02/2015.       Medical hx Surgical hx   Past Medical History:   Diagnosis Date     Alcohol abuse      Alcoholic cirrhosis of liver with ascites (H)       Past Surgical History:   Procedure Laterality Date     HERNIORRHAPHY UMBILICAL  2010          Medications  Prior to Admission medications    Medication Sig Start Date End Date Taking? Authorizing Provider   acetaminophen (TYLENOL) 500 MG tablet Take 1,000 mg by mouth every 6 hours as needed for mild pain (rarely takes)   Yes Reported, Patient   spironolactone (ALDACTONE) 100 MG tablet TAKE ONE TABLET BY MOUTH ONCE DAILY 5/3/17  Yes Jonny Baldwin MD  "  furosemide (LASIX) 40 MG tablet Take 1 tablet (40 mg) by mouth daily 5/3/17  Yes Jonny Baldwin MD   XIFAXAN 550 MG TABS tablet TAKE ONE TABLET BY MOUTH TWICE A DAY 3/31/17  Yes Jonny Baldwin MD   gabapentin (NEURONTIN) 300 MG capsule Take 300 mg by mouth daily as needed   Yes Reported, Patient   traZODone (DESYREL) 100 MG tablet Take 100 mg by mouth nightly as needed for sleep   Yes Reported, Patient   lactulose (CHRONULAC) 10 GM/15ML solution 2 times daily  4/16/15  Yes Reported, Patient   Multiple Vitamins-Minerals (MULTIVITAMIN PO) Take 1 tablet by mouth daily   Yes Reported, Patient   vitamin  B complex with vitamin C (VITAMIN  B COMPLEX) TABS Take 1 tablet by mouth daily   Yes Reported, Patient       Allergies  No Known Allergies    Review of systems  A 10-point review of systems was negative    Examination  /87 (BP Location: Right arm, Patient Position: Sitting, Cuff Size: Adult Regular)  Pulse 100  Temp 98.1  F (36.7  C) (Oral)  Resp 18  Ht 1.803 m (5' 10.98\")  Wt 94.6 kg (208 lb 9.6 oz)  SpO2 94%  BMI 29.11 kg/m2  Body mass index is 29.11 kg/(m^2).    Gen- well, NAD, A+Ox3, normal color  CVS- RRR  RS- CTA  Abd- obese, soft, non-tender, reducible supraumbilical midline hernia, no ascites or organomegaly on palpation or percussion, BS+  Extr- hands normal, no DORINA  Skin- no rash or jaundice  Neuro- no asterixis  Psych- normal mood    Laboratory  Lab Results   Component Value Date     01/08/2018    POTASSIUM 3.7 01/08/2018    CHLORIDE 102 01/08/2018    CO2 25 01/08/2018    BUN 9 01/08/2018    CR 0.60 01/08/2018       Lab Results   Component Value Date    BILITOTAL 1.8 01/08/2018    ALT 34 01/08/2018    AST 51 01/08/2018    ALKPHOS 161 01/08/2018       Lab Results   Component Value Date    ALBUMIN 2.6 01/08/2018    PROTTOTAL 6.2 01/08/2018        Lab Results   Component Value Date    WBC 5.1 01/08/2018    HGB 16.4 01/08/2018    MCV 96 01/08/2018    PLT 86 01/08/2018 "       Lab Results   Component Value Date    INR 1.31 01/08/2018       Radiology  Nil recent    Assessment  57-year-old male who presents to clinic for follow-up of decompensated alcoholic cirrhosis complicated with hepatic encephalopathy.  MELD-Na=13 (improved).  Last ETOH=02/2015.  Hyperbilirubinemia has significantly improved since last clinic visit.  Hepatic encephalopathy is well-controlled with current medications.  Due for HCC screening.  Due for screening for esophageal varices.     We discussed colorectal cancer screening.  Patient would prefer not to undergo colonoscopy as he does not like the idea of fasting.  He will discuss other options including stool FiT with his PCP.    Plan  1.  Continue alcohol cessation  2.  Low Na diet  3.  Continue lactulose  4.  Continue furosemide and sprionolactone  5.  Weight loss with diet and exercise  6.  Follow-up with PA in 6 months    Jonny Ramírez MD  Hepatology  [facility]

## 2018-01-08 NOTE — NURSING NOTE
Rubin Siddiqui      1.  Has the patient received the information for the influenza vaccine? YES    2.  Does the patient have any of the following contraindications?     Allergy to eggs? No     Allergic reaction to previous influenza vaccines? No     Any other problems to previous influenza vaccines? No     Paralyzed by Guillain-[place] syndrome? No     Currently pregnant? NO     Current moderate or severe illness? No     Allergy to contact lens solution? No    3.  The vaccine has been administered in the usual fashion and the patient was instructed to wait 20 minutes before leaving the building in the event of an allergic reaction: YES    Vaccination given by Coleen.  Recorded by Cassandra Mosher

## 2018-01-08 NOTE — LETTER
1/8/2018      RE: Rubin VELASQUEZ Artur  [address] [facility] [address]       [facility]    Hepatology follow-up    Follow-up visit for cirrhosis    Subjective:  57 year old male    Cirrhosis  - dx Feb 2015  - ETOH, last ETOH Feb 2015  - hx ascites  - hx HE  - no hx variceal bleeding  - EGD March 2015- no varices, ?mild portal HTN gastropathy  - HCC screening- Abd U/S July 2017    The patient presents to clinic this morning for follow-up of alcoholic cirrhosis.  Last clinic visit was 07/03/2017.  Since then, the patient had a fracture of his left wrist which resulted in surgery in 09/2017 at [facility].  The patient denies any new medications since his last clinic visit.      The patient is doing okay overall.  Of note, he has gained 20 pounds since last clinic visit which is most likely related to regularly eating out with his parents.  He does not get any regular exercise.  He feels that he could exercise more regularly if his midline abdominal hernia was repaired.  He denies any symptoms of pain or constipation with regard to his hernia.      The patient denies jaundice, lower extremity edema, abdominal distention, lethargy or confusion.      He denies melena, hematemesis or hematochezia.      No history of fevers, sweats or chills.  The patient already had an influenza vaccination this season.      The patient last drank alcohol in 02/2015.       Medical hx Surgical hx   Past Medical History:   Diagnosis Date     Alcohol abuse      Alcoholic cirrhosis of liver with ascites (H)       Past Surgical History:   Procedure Laterality Date     HERNIORRHAPHY UMBILICAL  2010          Medications  Prior to Admission medications    Medication Sig Start Date End Date Taking? Authorizing Provider   acetaminophen (TYLENOL) 500 MG tablet Take 1,000 mg by mouth every 6 hours as needed for mild pain (rarely takes)   Yes Reported, Patient   spironolactone (ALDACTONE) 100 MG tablet TAKE  "ONE TABLET BY MOUTH ONCE DAILY 5/3/17  Yes Jonny Baldwin MD   furosemide (LASIX) 40 MG tablet Take 1 tablet (40 mg) by mouth daily 5/3/17  Yes Jonny Baldwin MD   XIFAXAN 550 MG TABS tablet TAKE ONE TABLET BY MOUTH TWICE A DAY 3/31/17  Yes Jonny Baldwin MD   gabapentin (NEURONTIN) 300 MG capsule Take 300 mg by mouth daily as needed   Yes Reported, Patient   traZODone (DESYREL) 100 MG tablet Take 100 mg by mouth nightly as needed for sleep   Yes Reported, Patient   lactulose (CHRONULAC) 10 GM/15ML solution 2 times daily  4/16/15  Yes Reported, Patient   Multiple Vitamins-Minerals (MULTIVITAMIN PO) Take 1 tablet by mouth daily   Yes Reported, Patient   vitamin  B complex with vitamin C (VITAMIN  B COMPLEX) TABS Take 1 tablet by mouth daily   Yes Reported, Patient       Allergies  No Known Allergies    Review of systems  A 10-point review of systems was negative    Examination  /87 (BP Location: Right arm, Patient Position: Sitting, Cuff Size: Adult Regular)  Pulse 100  Temp 98.1  F (36.7  C) (Oral)  Resp 18  Ht 1.803 m (5' 10.98\")  Wt 94.6 kg (208 lb 9.6 oz)  SpO2 94%  BMI 29.11 kg/m2  Body mass index is 29.11 kg/(m^2).    Gen- well, NAD, A+Ox3, normal color  CVS- RRR  RS- CTA  Abd- obese, soft, non-tender, reducible supraumbilical midline hernia, no ascites or organomegaly on palpation or percussion, BS+  Extr- hands normal, no DORINA  Skin- no rash or jaundice  Neuro- no asterixis  Psych- normal mood    Laboratory  Lab Results   Component Value Date     01/08/2018    POTASSIUM 3.7 01/08/2018    CHLORIDE 102 01/08/2018    CO2 25 01/08/2018    BUN 9 01/08/2018    CR 0.60 01/08/2018       Lab Results   Component Value Date    BILITOTAL 1.8 01/08/2018    ALT 34 01/08/2018    AST 51 01/08/2018    ALKPHOS 161 01/08/2018       Lab Results   Component Value Date    ALBUMIN 2.6 01/08/2018    PROTTOTAL 6.2 01/08/2018        Lab Results   Component Value Date    WBC 5.1 01/08/2018 "    HGB 16.4 01/08/2018    MCV 96 01/08/2018    PLT 86 01/08/2018       Lab Results   Component Value Date    INR 1.31 01/08/2018       Radiology  Nil recent    Assessment  57-year-old male who presents to clinic for follow-up of decompensated alcoholic cirrhosis complicated with hepatic encephalopathy.  MELD-Na=13 (improved).  Last ETOH=02/2015.  Hyperbilirubinemia has significantly improved since last clinic visit.  Hepatic encephalopathy is well-controlled with current medications.  Due for HCC screening.  Due for screening for esophageal varices.     We discussed colorectal cancer screening.  Patient would prefer not to undergo colonoscopy as he does not like the idea of fasting.  He will discuss other options including stool FiT with his PCP.    Plan  1.  Continue alcohol cessation  2.  Low Na diet  3.  Continue lactulose  4.  Continue furosemide and sprionolactone  5.  Weight loss with diet and exercise  6.  Follow-up with PA in 6 months    Jonny Ramírez MD  Hepatology  [facility]

## 2018-01-08 NOTE — MR AVS SNAPSHOT
After Visit Summary   1/8/2018    Rubin Siddiqui    MRN: [id number]           Patient Information     Date Of Birth          1960        Visit Information        Provider Department      1/8/2018 11:00 AM Jonny Baldwin MD [facility] Hepatology        Today's Diagnoses     Alcoholic cirrhosis of liver with ascites (H)    -  1      Care Instructions    Preventive Care:    Colorectal Cancer Screening: During our visit today, we discussed that it is recommended you receive colorectal cancer screening. Please call or make an appointment with your primary care provider to discuss this. You may also call the [facility] scheduling line ([phone]) to set up a colonoscopy appointment.                Follow-ups after your visit        Follow-up notes from your care team     Return in about 6 months (around 7/8/2018).      Your next 10 appointments already scheduled     Jan 26, 2018  9:00 AM CST   US ABDOMEN COMPLETE with [id number]   [facility] [place], Ultrasound ([facility])    [address] [address] [phone]           Please bring a list of your medicines (including vitamins, minerals and over-the-counter drugs). Also, tell your doctor about any allergies you may have. Wear comfortable clothes and leave your valuables at home.  Adults: No eating or drinking for 8 hours before the exam. You may take medicine with a small sip of water.  Children: - Children 6+ years: No food or drink for 6 hours before exam. - Children 1-5 years: No food or drink for 4 hours before exam. - Infants, breast-fed: may have breast milk up to 2 hours before exam. - Infants, formula: may have bottle until 4 hours before exam.  Please call the Imaging Department at your exam site with any questions.            Jul 17, 2018  9:45 AM CDT   Lab with  LAB   [facility] Lab ([facility])    [address]  "Floor  [facility] [id number] [phone]            Jul 17, 2018 10:40 AM CDT   (Arrive by 10:25 AM)   Return General Liver with Jonny Rehman MD   [facility] Hepatology ([facility])    [address]  [address]  [facility] [id number] [phone]              Future tests that were ordered for you today     Open Future Orders        Priority Expected Expires Ordered    UPPER GI ENDOSCOPY Routine  2/22/2018 1/8/2018            Who to contact     If you have questions or need follow up information about today's clinic visit or your schedule please contact [facility] HEPATOLOGY directly at [phone].  Normal or non-critical lab and imaging results will be communicated to you by Coffee[employer]hart, letter or phone within 4 business days after the clinic has received the results. If you do not hear from us within 7 days, please contact the clinic through [employer] or phone. If you have a critical or abnormal lab result, we will notify you by phone as soon as possible.  Submit refill requests through [employer] or call your pharmacy and they will forward the refill request to us. Please allow 3 business days for your refill to be completed.          Additional Information About Your Visit        Norse Information     Norse gives you secure access to your electronic health record. If you see a primary care provider, you can also send messages to your care team and make appointments. If you have questions, please call your primary care clinic.  If you do not have a primary care provider, please call [phone] and they will assist you.        Care EveryWhere ID     This is your Care EveryWhere ID. This could be used by other organizations to access your [facility] medical records  [id number]        Your Vitals Were     Pulse Temperature Respirations Height Pulse Oximetry BMI (Body Mass Index)    100 98.1  F (36.7  C) (Oral) 18 1.803 m (5' 10.98\") 94% 29.11 kg/m2       Blood " Pressure from Last 3 Encounters:   01/08/18 126/87   07/03/17 117/77   01/10/17 138/83    Weight from Last 3 Encounters:   01/08/18 94.6 kg (208 lb 9.6 oz)   07/03/17 85.5 kg (188 lb 6.4 oz)   01/10/17 89.9 kg (198 lb 1.6 oz)               Primary Care Provider Office Phone # Fax #    Fernando Pak [phone]       [facility] [address] [address]  [facility] [address]        Equal Access to Services     [facility]: Hadii jace aggarwal hadvineeto Sotaryn, waaxda luneil, qaybta kaalmada jared, amie hewitt . So [facility] [phone].    ATENCIÓN: Si habla español, tiene a hancock disposición servicios gratuitos de asistencia lingüística. LlGrand [facility] [phone].    We comply with applicable federal civil rights laws and [place] laws. We do not discriminate on the basis of race, color, national origin, age, disability, sex, sexual orientation, or gender identity.            Thank you!     Thank you for choosing [facility] HEPATOLOGY  for your care. Our goal is always to provide you with excellent care. Hearing back from our patients is one way we can continue to improve our services. Please take a few minutes to complete the written survey that you may receive in the mail after your visit with us. Thank you!             Your Updated Medication List - Protect others around you: Learn how to safely use, store and throw away your medicines at www.disposemymeds.org.          This list is accurate as of: 1/8/18 12:19 PM.  Always use your most recent med list.                   Brand Name Dispense Instructions for use Diagnosis    acetaminophen 500 MG tablet    TYLENOL     Take 1,000 mg by mouth every 6 hours as needed for mild pain (rarely takes)    Alcoholic cirrhosis of liver with ascites (H)       furosemide 40 MG tablet    LASIX    30 tablet    Take 1 tablet (40 mg) by mouth daily    Cirrhosis of liver (H)       gabapentin 300 MG capsule    NEURONTIN     Take  300 mg by mouth daily as needed        lactulose 10 GM/15ML solution    CHRONULAC     2 times daily        MULTIVITAMIN PO      Take 1 tablet by mouth daily        spironolactone 100 MG tablet    ALDACTONE    30 tablet    TAKE ONE TABLET BY MOUTH ONCE DAILY    Cirrhosis of liver (H)       traZODone 100 MG tablet    DESYREL     Take 100 mg by mouth nightly as needed for sleep        vitamin B complex with vitamin C Tabs tablet      Take 1 tablet by mouth daily        XIFAXAN 550 MG Tabs tablet   Generic drug:  rifaximin     60 tablet    TAKE ONE TABLET BY MOUTH TWICE A DAY    Alcoholic cirrhosis of liver with ascites (H), Hepatic encephalopathy (H)

## 2018-01-08 NOTE — PATIENT INSTRUCTIONS
Preventive Care:    Colorectal Cancer Screening: During our visit today, we discussed that it is recommended you receive colorectal cancer screening. Please call or make an appointment with your primary care provider to discuss this. You may also call the [facility] scheduling line ([phone]) to set up a colonoscopy appointment.

## 2018-01-19 NOTE — TELEPHONE ENCOUNTER
Patient would like to have a colonoscopy at the same time, please place order for scheduling.    Patient scheduled for upper endoscopy    Indication for procedure. Variceal screening    Referring Provider. Dr. Ramírez    ? no    Arrival time verified? Yes, 10:00 am    Facility location verified? [address] [facility], room 1-[address]    Instructions given regarding prep and procedure    Prep Type NPO for 6 hours prior to procedure    Are you taking any anticoagulants or blood thinners? no    Instructions given? Yes verbally and mailed    Electronic implanted devices? no    Pre procedure teaching completed? Yes    Transportation from procedure? Yes, mother    H&P / Pre op physical completed? Na  Ann Lara, SHARI

## 2018-01-19 NOTE — TELEPHONE ENCOUNTER
Dr. Ramírez    Patient scheduled for colonoscopy    Indication for procedure. screening    Referring Provider. Dr. Ramírez    ? no    Arrival time verified? Yes, 10:00 am    Facility location verified? [address] [facility], room 1-301    Instructions given regarding prep and procedure    Prep Type [place]    Are you taking any anticoagulants or blood thinners? no    Instructions given? Yes, verbally and mailed    Electronic implanted devices? no    Pre procedure teaching completed? Yes    Transportation from procedure? Yes, mother    H&P / Pre op physical completed? Na    Ann Lara RN

## 2018-01-26 NOTE — OR NURSING
Pt tolerated EGD well on 2L NC. No interventions for EGD. VSS throughout. Tolerated colon well on 2L NC. No interventions. VSS throughout.

## 2018-06-25 DIAGNOSIS — K70.31 ALCOHOLIC CIRRHOSIS OF LIVER WITH ASCITES (H): Primary | ICD-10-CM

## 2018-06-25 DIAGNOSIS — K76.82 HEPATIC ENCEPHALOPATHY (H): ICD-10-CM

## 2023-02-28 NOTE — TELEPHONE ENCOUNTER
Request has been submitted via [facility]. Waiting for questions to generate before completing PA.   Olanzapine Pregnancy And Lactation Text: This medication is pregnancy category C.   There are no adequate and well controlled trials with olanzapine in pregnant females.  Olanzapine should be used during pregnancy only if the potential benefit justifies the potential risk to the fetus.   In a study in lactating healthy women, olanzapine was excreted in breast milk.  It is recommended that women taking olanzapine should not breast feed.

## (undated) RX ORDER — SIMETHICONE 20 MG/.3ML
EMULSION ORAL
Status: DISPENSED
Start: 2018-01-01

## (undated) RX ORDER — FENTANYL CITRATE 50 UG/ML
INJECTION, SOLUTION INTRAMUSCULAR; INTRAVENOUS
Status: DISPENSED
Start: 2018-01-01

## (undated) RX ORDER — DIPHENHYDRAMINE HYDROCHLORIDE 50 MG/ML
INJECTION INTRAMUSCULAR; INTRAVENOUS
Status: DISPENSED
Start: 2018-01-01